# Patient Record
Sex: FEMALE | Race: OTHER | URBAN - NONMETROPOLITAN AREA
[De-identification: names, ages, dates, MRNs, and addresses within clinical notes are randomized per-mention and may not be internally consistent; named-entity substitution may affect disease eponyms.]

---

## 2019-07-03 NOTE — PATIENT DISCUSSION
Stopped Today: neomycin-polymyxin-dexameth (neomycin-polymyxin-dexameth): drops,suspension: 3.5-10,000-0.1 mg/mL-unit/mL-% 06-

## 2019-07-03 NOTE — PATIENT DISCUSSION
New Prescription: Maxitrol (neomycin-polymyxin-dexameth): ointment: 3.5-10,000-0.1 mg-unit/g-% 1 a thin layer twice a day into affected eye 07-

## 2019-07-03 NOTE — PATIENT DISCUSSION
CHALAZION RUL AND RLL:  PROCEED WITH INCISION AND DRAINAGE. WILL ALSO INJECT KENALOG DUE TO LONGSTANDING CHALAZION. INJECTED 0.1 CC OF KENALOG, HALF INTO RUL AND HALF INTO RLL.

## 2019-08-07 NOTE — PATIENT DISCUSSION
New Prescription: doxycycline hyclate (doxycycline hyclate): tablet: 100 mg 1 tablet twice a day as directed by mouth 08-

## 2019-08-07 NOTE — PATIENT DISCUSSION
Continue: neomycin-polymyxin-dexameth (neomycin-polymyxin-dexameth): ointment: 3.5-10,000-0.1 mg-unit/g-% 07-

## 2019-09-12 NOTE — PATIENT DISCUSSION
CHALAZION OD: PRESCRIBED WARM COMPRESSES, EYELID SCRUBS AND MAXITROL OINTMENT. COMPLETED DOXY COURSE. MIGHT NEED STEROID  INJECTION IF IT DOESN'T GO AWAY COMPLETELY.

## 2019-11-12 NOTE — PATIENT DISCUSSION
CHALAZION OD: CONTINUE WARM COMPRESSES, EYELID SCRUBS AND MAXITROL OINTMENT. PRESCRIBED ANOTHER ROUND OF  DOXY 150MG BID. WILL INJECT KENALOG RUL AND RLL.

## 2019-11-12 NOTE — PATIENT DISCUSSION
New Prescription: Avenova (hypochlorous acid-sodium chlor): spray,non-aerosol: 0.01% 1 spray once a day as directed 11-

## 2019-11-12 NOTE — PATIENT DISCUSSION
Stopped Today: neomycin-polymyxin-dexameth (neomycin-polymyxin-dexameth): ointment: 3.5-10,000-0.1 mg-unit/g-% 07-

## 2019-11-12 NOTE — PATIENT DISCUSSION
New Prescription: neomycin-polymyxin-dexameth (neomycin-polymyxin-dexameth): ointment: 3.5-10,000-0.1 mg-unit/g-% 1 a thin layer twice a day into affected eye 11-

## 2019-11-12 NOTE — PATIENT DISCUSSION
New Prescription: doxycycline hyclate (doxycycline hyclate): tablet,delayed release (DR/EC): 150 mg 1 tablet twice a day by mouth 11-

## 2019-12-12 NOTE — PATIENT DISCUSSION
Continue: doxycycline hyclate (doxycycline hyclate): tablet,delayed release (DR/EC): 100 mg 1 tablet twice a day as directed by mouth 11-

## 2019-12-12 NOTE — PATIENT DISCUSSION
CHALAZIONS- MUCH IMPROVED TODAY AFTER KENALOG INJECTIONS RUL AND RLL. PATIENT WAS NOT ABLE TO GET AVENOVA OR DOXY. BUT STILL DOING HOT COMPRESSES WHICH SHE SHOULD CONTINUE.

## 2022-07-29 ENCOUNTER — NEW PATIENT (OUTPATIENT)
Dept: URBAN - NONMETROPOLITAN AREA CLINIC 1 | Facility: CLINIC | Age: 77
End: 2022-07-29

## 2022-07-29 DIAGNOSIS — H52.4: ICD-10-CM

## 2022-07-29 DIAGNOSIS — H25.813: ICD-10-CM

## 2022-07-29 DIAGNOSIS — H40.1134: ICD-10-CM

## 2022-07-29 PROCEDURE — 99204 OFFICE O/P NEW MOD 45 MIN: CPT

## 2022-07-29 PROCEDURE — 92015 DETERMINE REFRACTIVE STATE: CPT

## 2022-07-29 PROCEDURE — 92133 CPTRZD OPH DX IMG PST SGM ON: CPT

## 2022-07-29 ASSESSMENT — TONOMETRY
OD_IOP_MMHG: 20
OS_IOP_MMHG: 19

## 2022-07-29 ASSESSMENT — VISUAL ACUITY
OD_CC: 20/50
OS_PH: 20/63
OS_CC: 20/100

## 2022-11-30 ENCOUNTER — CONSULTATION/EVALUATION (OUTPATIENT)
Dept: URBAN - NONMETROPOLITAN AREA CLINIC 1 | Facility: CLINIC | Age: 77
End: 2022-11-30

## 2022-11-30 DIAGNOSIS — H25.813: ICD-10-CM

## 2022-11-30 PROCEDURE — 99214 OFFICE O/P EST MOD 30 MIN: CPT

## 2022-11-30 ASSESSMENT — VISUAL ACUITY
OD_PAM: 20/25
OD_CC: 20/63-
OS_PH: 20/63
OD_PH: 20/50
OD_BAT: 20/150
OS_AM: 20/32
OS_CC: 20/100
OS_BAT: 20/200

## 2022-11-30 ASSESSMENT — TONOMETRY
OD_IOP_MMHG: 19
OS_IOP_MMHG: 19

## 2022-12-21 ENCOUNTER — PRE-OP/H&P (OUTPATIENT)
Dept: URBAN - NONMETROPOLITAN AREA CLINIC 1 | Facility: CLINIC | Age: 77
End: 2022-12-21

## 2022-12-21 VITALS
HEART RATE: 60 BPM | WEIGHT: 139 LBS | SYSTOLIC BLOOD PRESSURE: 141 MMHG | DIASTOLIC BLOOD PRESSURE: 78 MMHG | BODY MASS INDEX: 31.27 KG/M2 | HEIGHT: 56 IN

## 2022-12-21 PROCEDURE — 99499 UNLISTED E&M SERVICE: CPT

## 2022-12-21 ASSESSMENT — VISUAL ACUITY
OD_PH: 20/50
OS_PH: 20/63
OD_BAT: 20/150
OS_BAT: 20/200
OS_CC: 20/100
OS_AM: 20/32
OD_PAM: 20/25
OD_CC: 20/63-

## 2023-01-03 ENCOUNTER — SURGERY/PROCEDURE (OUTPATIENT)
Dept: URBAN - NONMETROPOLITAN AREA CLINIC 1 | Facility: CLINIC | Age: 78
End: 2023-01-03

## 2023-01-03 DIAGNOSIS — H25.811: ICD-10-CM

## 2023-01-03 PROCEDURE — 66984CV REMOVE CATARACT, INSERT LENS, CUSTOM VISION

## 2023-01-03 PROCEDURE — 66999LRI LRI

## 2023-01-04 ENCOUNTER — POST-OP (OUTPATIENT)
Dept: URBAN - NONMETROPOLITAN AREA CLINIC 1 | Facility: CLINIC | Age: 78
End: 2023-01-04

## 2023-01-04 DIAGNOSIS — Z98.41: ICD-10-CM

## 2023-01-04 PROCEDURE — 99024 POSTOP FOLLOW-UP VISIT: CPT

## 2023-01-04 ASSESSMENT — VISUAL ACUITY
OD_SC: 20/30
OS_SC: 20/70+1

## 2023-01-04 ASSESSMENT — TONOMETRY
OD_IOP_MMHG: 24
OS_IOP_MMHG: 18

## 2023-01-09 ENCOUNTER — POST OP/EVAL OF SECOND EYE (OUTPATIENT)
Dept: URBAN - NONMETROPOLITAN AREA CLINIC 1 | Facility: CLINIC | Age: 78
End: 2023-01-09

## 2023-01-09 DIAGNOSIS — H25.812: ICD-10-CM

## 2023-01-09 PROCEDURE — 99213 OFFICE O/P EST LOW 20 MIN: CPT

## 2023-01-09 ASSESSMENT — VISUAL ACUITY
OS_PH: 20/60
OS_BAT: 20/200
OS_AM: 20/30
OS_CC: 20/70-1
OD_SC: 20/25-1

## 2023-01-09 ASSESSMENT — TONOMETRY
OD_IOP_MMHG: 16
OS_IOP_MMHG: 16

## 2023-01-17 ENCOUNTER — SURGERY/PROCEDURE (OUTPATIENT)
Dept: URBAN - NONMETROPOLITAN AREA CLINIC 1 | Facility: CLINIC | Age: 78
End: 2023-01-17

## 2023-01-17 DIAGNOSIS — H25.812: ICD-10-CM

## 2023-01-17 PROCEDURE — 66999LRI LRI

## 2023-01-17 PROCEDURE — 66984 XCAPSL CTRC RMVL W/O ECP: CPT

## 2023-01-17 PROCEDURE — 68841 INSJ RX ELUT IMPLT LAC CANAL: CPT

## 2023-01-18 ENCOUNTER — POST-OP (OUTPATIENT)
Dept: URBAN - NONMETROPOLITAN AREA CLINIC 1 | Facility: CLINIC | Age: 78
End: 2023-01-18

## 2023-01-18 DIAGNOSIS — Z98.41: ICD-10-CM

## 2023-01-18 DIAGNOSIS — Z98.42: ICD-10-CM

## 2023-01-18 PROCEDURE — 99024 POSTOP FOLLOW-UP VISIT: CPT

## 2023-01-18 ASSESSMENT — VISUAL ACUITY
OU_SC: 20/20-2
OD_SC: 20/22-2
OS_SC: 20/100

## 2023-01-18 ASSESSMENT — TONOMETRY
OS_IOP_MMHG: 20
OD_IOP_MMHG: 13

## 2023-01-23 ENCOUNTER — POST-OP (OUTPATIENT)
Dept: URBAN - NONMETROPOLITAN AREA CLINIC 1 | Facility: CLINIC | Age: 78
End: 2023-01-23

## 2023-01-23 DIAGNOSIS — Z98.41: ICD-10-CM

## 2023-01-23 DIAGNOSIS — Z98.42: ICD-10-CM

## 2023-01-23 PROCEDURE — 99024 POSTOP FOLLOW-UP VISIT: CPT

## 2023-01-23 ASSESSMENT — TONOMETRY
OD_IOP_MMHG: 16
OS_IOP_MMHG: 16

## 2023-01-23 ASSESSMENT — VISUAL ACUITY
OS_SC: 20/30-2
OD_SC: 20/20-2

## 2023-05-08 ENCOUNTER — FOLLOW UP (OUTPATIENT)
Dept: URBAN - NONMETROPOLITAN AREA CLINIC 1 | Facility: CLINIC | Age: 78
End: 2023-05-08

## 2023-05-08 DIAGNOSIS — Z96.1: ICD-10-CM

## 2023-05-08 DIAGNOSIS — H40.1131: ICD-10-CM

## 2023-05-08 DIAGNOSIS — H26.493: ICD-10-CM

## 2023-05-08 DIAGNOSIS — H16.223: ICD-10-CM

## 2023-05-08 PROCEDURE — 92133 CPTRZD OPH DX IMG PST SGM ON: CPT

## 2023-05-08 PROCEDURE — 76514 ECHO EXAM OF EYE THICKNESS: CPT

## 2023-05-08 PROCEDURE — 99214 OFFICE O/P EST MOD 30 MIN: CPT

## 2023-05-08 PROCEDURE — 92083 EXTENDED VISUAL FIELD XM: CPT

## 2023-05-08 ASSESSMENT — VISUAL ACUITY
OD_SC: 20/25
OS_SC: 20/25

## 2023-05-08 ASSESSMENT — TONOMETRY
OD_IOP_MMHG: 14
OS_IOP_MMHG: 14

## 2023-05-08 ASSESSMENT — PACHYMETRY
OS_CT_UM: 524
OD_CT_UM: 542

## 2023-11-10 ENCOUNTER — FOLLOW UP (OUTPATIENT)
Dept: URBAN - NONMETROPOLITAN AREA CLINIC 1 | Facility: CLINIC | Age: 78
End: 2023-11-10

## 2023-11-10 DIAGNOSIS — H43.811: ICD-10-CM

## 2023-11-10 DIAGNOSIS — H40.1131: ICD-10-CM

## 2023-11-10 PROCEDURE — 92020 GONIOSCOPY: CPT

## 2023-11-10 PROCEDURE — 92250 FUNDUS PHOTOGRAPHY W/I&R: CPT

## 2023-11-10 PROCEDURE — 99214 OFFICE O/P EST MOD 30 MIN: CPT

## 2023-11-10 ASSESSMENT — TONOMETRY
OS_IOP_MMHG: 14
OD_IOP_MMHG: 14

## 2023-11-10 ASSESSMENT — VISUAL ACUITY
OD_SC: 20/25-1
OS_SC: 20/22+2
OU_SC: 20/20

## 2024-05-13 ENCOUNTER — FOLLOW UP (OUTPATIENT)
Dept: URBAN - NONMETROPOLITAN AREA CLINIC 1 | Facility: CLINIC | Age: 79
End: 2024-05-13

## 2024-05-13 DIAGNOSIS — H16.223: ICD-10-CM

## 2024-05-13 DIAGNOSIS — H40.1131: ICD-10-CM

## 2024-05-13 PROCEDURE — 92133 CPTRZD OPH DX IMG PST SGM ON: CPT

## 2024-05-13 PROCEDURE — 99213 OFFICE O/P EST LOW 20 MIN: CPT

## 2024-05-13 PROCEDURE — 92083 EXTENDED VISUAL FIELD XM: CPT

## 2024-05-13 ASSESSMENT — VISUAL ACUITY
OS_SC: 20/22
OU_SC: 20/22
OD_SC: 20/25

## 2024-05-13 ASSESSMENT — TONOMETRY
OD_IOP_MMHG: 14
OS_IOP_MMHG: 14

## 2024-11-18 ENCOUNTER — FOLLOW UP (OUTPATIENT)
Dept: URBAN - NONMETROPOLITAN AREA CLINIC 1 | Facility: CLINIC | Age: 79
End: 2024-11-18

## 2024-11-18 DIAGNOSIS — H40.1131: ICD-10-CM

## 2024-11-18 DIAGNOSIS — H52.4: ICD-10-CM

## 2024-11-18 PROCEDURE — 92015 DETERMINE REFRACTIVE STATE: CPT

## 2024-11-18 PROCEDURE — 92014 COMPRE OPH EXAM EST PT 1/>: CPT

## 2024-11-18 PROCEDURE — 92250 FUNDUS PHOTOGRAPHY W/I&R: CPT

## 2025-05-29 ENCOUNTER — FOLLOW UP (OUTPATIENT)
Age: 80
End: 2025-05-29

## 2025-05-29 DIAGNOSIS — H26.493: ICD-10-CM

## 2025-05-29 DIAGNOSIS — Z96.1: ICD-10-CM

## 2025-05-29 DIAGNOSIS — H16.223: ICD-10-CM

## 2025-05-29 DIAGNOSIS — H40.1131: ICD-10-CM

## 2025-05-29 DIAGNOSIS — H43.811: ICD-10-CM

## 2025-05-29 PROCEDURE — 99214 OFFICE O/P EST MOD 30 MIN: CPT

## 2025-05-29 PROCEDURE — 92133 CPTRZD OPH DX IMG PST SGM ON: CPT

## 2025-05-29 PROCEDURE — 92083 EXTENDED VISUAL FIELD XM: CPT
